# Patient Record
Sex: FEMALE | Race: WHITE
[De-identification: names, ages, dates, MRNs, and addresses within clinical notes are randomized per-mention and may not be internally consistent; named-entity substitution may affect disease eponyms.]

---

## 2017-05-18 ENCOUNTER — APPOINTMENT (OUTPATIENT)
Dept: DERMATOLOGY | Facility: CLINIC | Age: 62
End: 2017-05-18

## 2017-08-29 ENCOUNTER — APPOINTMENT (OUTPATIENT)
Dept: OPHTHALMOLOGY | Facility: CLINIC | Age: 62
End: 2017-08-29

## 2017-09-13 ENCOUNTER — APPOINTMENT (OUTPATIENT)
Dept: DERMATOLOGY | Facility: CLINIC | Age: 62
End: 2017-09-13
Payer: COMMERCIAL

## 2017-09-13 PROCEDURE — 99214 OFFICE O/P EST MOD 30 MIN: CPT

## 2017-12-13 ENCOUNTER — MOBILE ON CALL (OUTPATIENT)
Age: 62
End: 2017-12-13

## 2017-12-13 ENCOUNTER — LABORATORY RESULT (OUTPATIENT)
Age: 62
End: 2017-12-13

## 2017-12-13 ENCOUNTER — APPOINTMENT (OUTPATIENT)
Dept: DERMATOLOGY | Facility: CLINIC | Age: 62
End: 2017-12-13
Payer: COMMERCIAL

## 2017-12-13 PROCEDURE — 12032 INTMD RPR S/A/T/EXT 2.6-7.5: CPT

## 2017-12-13 PROCEDURE — 11406 EXC TR-EXT B9+MARG >4.0 CM: CPT

## 2017-12-20 ENCOUNTER — APPOINTMENT (OUTPATIENT)
Dept: DERMATOLOGY | Facility: CLINIC | Age: 62
End: 2017-12-20
Payer: COMMERCIAL

## 2017-12-20 DIAGNOSIS — L72.9 FOLLICULAR CYST OF THE SKIN AND SUBCUTANEOUS TISSUE, UNSPECIFIED: ICD-10-CM

## 2017-12-20 PROCEDURE — 99024 POSTOP FOLLOW-UP VISIT: CPT

## 2018-04-11 ENCOUNTER — APPOINTMENT (OUTPATIENT)
Dept: DERMATOLOGY | Facility: CLINIC | Age: 63
End: 2018-04-11
Payer: COMMERCIAL

## 2018-04-11 VITALS
HEIGHT: 62 IN | WEIGHT: 128 LBS | SYSTOLIC BLOOD PRESSURE: 126 MMHG | BODY MASS INDEX: 23.55 KG/M2 | DIASTOLIC BLOOD PRESSURE: 80 MMHG

## 2018-04-11 PROCEDURE — 99214 OFFICE O/P EST MOD 30 MIN: CPT | Mod: 25

## 2018-04-11 PROCEDURE — 17000 DESTRUCT PREMALG LESION: CPT

## 2019-05-06 ENCOUNTER — APPOINTMENT (OUTPATIENT)
Dept: DERMATOLOGY | Facility: CLINIC | Age: 64
End: 2019-05-06
Payer: COMMERCIAL

## 2019-05-06 PROCEDURE — 99214 OFFICE O/P EST MOD 30 MIN: CPT | Mod: GC

## 2020-07-20 ENCOUNTER — APPOINTMENT (OUTPATIENT)
Dept: DERMATOLOGY | Facility: CLINIC | Age: 65
End: 2020-07-20
Payer: COMMERCIAL

## 2020-07-20 VITALS — BODY MASS INDEX: 34.15 KG/M2 | WEIGHT: 200 LBS | HEIGHT: 64 IN

## 2020-07-20 VITALS — TEMPERATURE: 97.7 F

## 2020-07-20 DIAGNOSIS — D23.9 OTHER BENIGN NEOPLASM OF SKIN, UNSPECIFIED: ICD-10-CM

## 2020-07-20 PROCEDURE — 99213 OFFICE O/P EST LOW 20 MIN: CPT

## 2020-11-20 ENCOUNTER — APPOINTMENT (OUTPATIENT)
Dept: DERMATOLOGY | Facility: CLINIC | Age: 65
End: 2020-11-20

## 2021-03-17 ENCOUNTER — APPOINTMENT (OUTPATIENT)
Dept: DERMATOLOGY | Facility: CLINIC | Age: 66
End: 2021-03-17
Payer: COMMERCIAL

## 2021-03-17 ENCOUNTER — NON-APPOINTMENT (OUTPATIENT)
Age: 66
End: 2021-03-17

## 2021-03-17 DIAGNOSIS — R23.8 OTHER SKIN CHANGES: ICD-10-CM

## 2021-03-17 PROCEDURE — 99072 ADDL SUPL MATRL&STAF TM PHE: CPT

## 2021-03-17 PROCEDURE — 99213 OFFICE O/P EST LOW 20 MIN: CPT | Mod: 25

## 2021-03-17 PROCEDURE — 17000 DESTRUCT PREMALG LESION: CPT

## 2022-01-24 ENCOUNTER — APPOINTMENT (OUTPATIENT)
Dept: DERMATOLOGY | Facility: CLINIC | Age: 67
End: 2022-01-24
Payer: COMMERCIAL

## 2022-01-24 DIAGNOSIS — D21.9 BENIGN NEOPLASM OF CONNECTIVE AND OTHER SOFT TISSUE, UNSPECIFIED: ICD-10-CM

## 2022-01-24 DIAGNOSIS — L82.0 INFLAMED SEBORRHEIC KERATOSIS: ICD-10-CM

## 2022-01-24 DIAGNOSIS — Z86.018 PERSONAL HISTORY OF OTHER BENIGN NEOPLASM: ICD-10-CM

## 2022-01-24 DIAGNOSIS — L72.3 SEBACEOUS CYST: ICD-10-CM

## 2022-01-24 PROCEDURE — 11900 INJECT SKIN LESIONS </W 7: CPT | Mod: 59

## 2022-01-24 PROCEDURE — 17110 DESTRUCTION B9 LES UP TO 14: CPT | Mod: 59

## 2022-01-24 PROCEDURE — 99214 OFFICE O/P EST MOD 30 MIN: CPT | Mod: 25

## 2022-01-24 RX ORDER — CEPHALEXIN 500 MG/1
500 CAPSULE ORAL
Qty: 14 | Refills: 0 | Status: ACTIVE | COMMUNITY
Start: 2017-09-13 | End: 1900-01-01

## 2022-01-25 PROBLEM — Z86.018 HISTORY OF MULTIPLE BENIGN MELANOCYTIC NEVI: Status: RESOLVED | Noted: 2019-05-06 | Resolved: 2022-01-24

## 2022-06-29 ENCOUNTER — APPOINTMENT (OUTPATIENT)
Dept: DERMATOLOGY | Facility: CLINIC | Age: 67
End: 2022-06-29

## 2023-01-19 ENCOUNTER — TRANSCRIPTION ENCOUNTER (OUTPATIENT)
Age: 68
End: 2023-01-19

## 2023-02-09 ENCOUNTER — OFFICE (OUTPATIENT)
Dept: URBAN - METROPOLITAN AREA CLINIC 90 | Facility: CLINIC | Age: 68
Setting detail: OPHTHALMOLOGY
End: 2023-02-09
Payer: COMMERCIAL

## 2023-02-09 DIAGNOSIS — H25.13: ICD-10-CM

## 2023-02-09 DIAGNOSIS — H01.002: ICD-10-CM

## 2023-02-09 DIAGNOSIS — H40.012: ICD-10-CM

## 2023-02-09 DIAGNOSIS — H01.001: ICD-10-CM

## 2023-02-09 DIAGNOSIS — H18.413: ICD-10-CM

## 2023-02-09 DIAGNOSIS — H01.005: ICD-10-CM

## 2023-02-09 DIAGNOSIS — H01.004: ICD-10-CM

## 2023-02-09 DIAGNOSIS — H16.223: ICD-10-CM

## 2023-02-09 DIAGNOSIS — H04.211: ICD-10-CM

## 2023-02-09 PROCEDURE — 92133 CPTRZD OPH DX IMG PST SGM ON: CPT | Performed by: OPHTHALMOLOGY

## 2023-02-09 PROCEDURE — 92083 EXTENDED VISUAL FIELD XM: CPT | Performed by: OPHTHALMOLOGY

## 2023-02-09 PROCEDURE — 92014 COMPRE OPH EXAM EST PT 1/>: CPT | Performed by: OPHTHALMOLOGY

## 2023-02-09 ASSESSMENT — REFRACTION_MANIFEST
OD_ADD: +2.50
OD_VA2: 20/J1
OD_AXIS: 140
OD_CYLINDER: +0.75
OD_VA1: 20/20
OS_AXIS: 180
OD_SPHERE: +2.25
OS_VA1: 20/20
OS_ADD: +2.50
OD_VA2: 20/J1
OD_AXIS: 055
OD_CYLINDER: +0.75
OD_SPHERE: +2.00
OS_ADD: +2.25
OS_SPHERE: +3.00
OS_SPHERE: +2.75
OS_AXIS: 180
OS_VA1: 20/20
OS_CYLINDER: +1.00
OS_VA2: 20/J1
OS_SPHERE: +3.00
OD_AXIS: 055
OS_AXIS: 085
OD_ADD: +2.25
OS_VA2: 20/J1
OD_VA1: 20/20
OD_VA2: 20/J1
OS_VA1: 20/20
OD_SPHERE: +2.25
OD_CYLINDER: +0.75
OS_CYLINDER: +0.50
OS_VA2: 20/J1
OS_ADD: +2.50
OS_CYLINDER: +0.50
OD_VA1: 20/20
OD_ADD: +2.50

## 2023-02-09 ASSESSMENT — REFRACTION_CURRENTRX
OD_CYLINDER: -0.75
OD_OVR_VA: 20/
OD_AXIS: 064
OD_VPRISM_DIRECTION: PROGS
OS_OVR_VA: 20/
OS_SPHERE: +3.75
OD_SPHERE: +3.00
OS_AXIS: 001
OD_SPHERE: +2.50
OD_VPRISM_DIRECTION: PROGS
OD_ADD: +2.50
OS_ADD: +2.50
OD_AXIS: 144
OS_OVR_VA: 20/
OS_AXIS: 176
OD_AXIS: 059
OS_ADD: +2.25
OD_SPHERE: +2.50
OS_AXIS: 086
OD_CYLINDER: +0.75
OS_VPRISM_DIRECTION: SV
OD_ADD: +2.25
OD_OVR_VA: 20/
OD_OVR_VA: 20/
OS_ADD: +2.25
OS_VPRISM_DIRECTION: PROGS
OD_CYLINDER: +0.75
OS_OVR_VA: 20/
OS_CYLINDER: +0.50
OS_SPHERE: +3.00
OD_VPRISM_DIRECTION: SV
OD_ADD: +2.25
OS_CYLINDER: -0.75
OS_SPHERE: +3.25
OS_CYLINDER: +0.50
OS_VPRISM_DIRECTION: PROGS

## 2023-02-09 ASSESSMENT — AXIALLENGTH_DERIVED
OS_AL: 22.4732
OD_AL: 22.5276
OS_AL: 22.2544
OD_AL: 22.3514
OD_AL: 22.5276
OS_AL: 22.4732
OS_AL: 22.4732
OD_AL: 22.6168

## 2023-02-09 ASSESSMENT — LID EXAM ASSESSMENTS
OD_BLEPHARITIS: RLL RUL 1+ 2+
OS_BLEPHARITIS: LLL LUL 1+ 2+

## 2023-02-09 ASSESSMENT — SPHEQUIV_DERIVED
OS_SPHEQUIV: 3.875
OD_SPHEQUIV: 2.625
OS_SPHEQUIV: 3.25
OS_SPHEQUIV: 3.25
OD_SPHEQUIV: 2.375
OD_SPHEQUIV: 2.625
OS_SPHEQUIV: 3.25
OD_SPHEQUIV: 3.125

## 2023-02-09 ASSESSMENT — REFRACTION_AUTOREFRACTION
OS_CYLINDER: -1.25
OD_SPHERE: +3.50
OD_AXIS: 134
OS_SPHERE: +4.50
OS_AXIS: 083
OD_CYLINDER: -0.75

## 2023-02-09 ASSESSMENT — TONOMETRY
OD_IOP_MMHG: 16
OS_IOP_MMHG: 16

## 2023-02-09 ASSESSMENT — KERATOMETRY
OS_K1POWER_DIOPTERS: 42.75
OS_AXISANGLE_DEGREES: 167
OD_K1POWER_DIOPTERS: 43.25
OD_AXISANGLE_DEGREES: 046
OS_K2POWER_DIOPTERS: 43.75
OD_K2POWER_DIOPTERS: 44.25
METHOD_AUTO_MANUAL: AUTO

## 2023-02-09 ASSESSMENT — VISUAL ACUITY
OS_BCVA: 20/20
OD_BCVA: 20/20-1

## 2023-02-09 ASSESSMENT — CONFRONTATIONAL VISUAL FIELD TEST (CVF)
OD_FINDINGS: FULL
OS_FINDINGS: FULL

## 2023-02-09 ASSESSMENT — SUPERFICIAL PUNCTATE KERATITIS (SPK)
OD_SPK: T
OS_SPK: 1+

## 2023-02-09 ASSESSMENT — TEAR BREAK UP TIME (TBUT)
OS_TBUT: 1+
OD_TBUT: 1+

## 2023-03-08 ENCOUNTER — APPOINTMENT (OUTPATIENT)
Dept: DERMATOLOGY | Facility: CLINIC | Age: 68
End: 2023-03-08
Payer: COMMERCIAL

## 2023-03-08 DIAGNOSIS — L57.0 ACTINIC KERATOSIS: ICD-10-CM

## 2023-03-08 PROCEDURE — 17000 DESTRUCT PREMALG LESION: CPT | Mod: 59

## 2023-03-08 PROCEDURE — 99213 OFFICE O/P EST LOW 20 MIN: CPT | Mod: 25

## 2023-03-08 PROCEDURE — 17003 DESTRUCT PREMALG LES 2-14: CPT | Mod: 59

## 2023-11-03 ENCOUNTER — OFFICE (OUTPATIENT)
Dept: URBAN - METROPOLITAN AREA CLINIC 90 | Facility: CLINIC | Age: 68
Setting detail: OPHTHALMOLOGY
End: 2023-11-03
Payer: COMMERCIAL

## 2023-11-03 DIAGNOSIS — H00.11: ICD-10-CM

## 2023-11-03 DIAGNOSIS — H01.005: ICD-10-CM

## 2023-11-03 DIAGNOSIS — H01.004: ICD-10-CM

## 2023-11-03 DIAGNOSIS — H01.001: ICD-10-CM

## 2023-11-03 DIAGNOSIS — H01.002: ICD-10-CM

## 2023-11-03 PROCEDURE — 99213 OFFICE O/P EST LOW 20 MIN: CPT | Performed by: OPHTHALMOLOGY

## 2023-11-03 ASSESSMENT — SUPERFICIAL PUNCTATE KERATITIS (SPK)
OS_SPK: 1+
OD_SPK: T

## 2023-11-03 ASSESSMENT — CONFRONTATIONAL VISUAL FIELD TEST (CVF)
OS_FINDINGS: FULL
OD_FINDINGS: FULL

## 2023-11-03 ASSESSMENT — TEAR BREAK UP TIME (TBUT)
OD_TBUT: 1+
OS_TBUT: 1+

## 2023-11-03 ASSESSMENT — LID EXAM ASSESSMENTS
OD_BLEPHARITIS: RLL RUL 1+ 2+
OS_BLEPHARITIS: LLL LUL 1+ 2+

## 2023-11-17 ENCOUNTER — RX ONLY (RX ONLY)
Age: 68
End: 2023-11-17

## 2023-11-17 ASSESSMENT — REFRACTION_CURRENTRX
OD_CYLINDER: -0.75
OS_SPHERE: +3.75
OD_CYLINDER: +0.75
OD_AXIS: 059
OS_OVR_VA: 20/
OS_OVR_VA: 20/
OD_ADD: +2.25
OS_CYLINDER: +0.50
OS_AXIS: 176
OD_AXIS: 144
OS_CYLINDER: -0.75
OD_VPRISM_DIRECTION: SV
OD_SPHERE: +2.50
OS_AXIS: 001
OS_VPRISM_DIRECTION: PROGS
OD_ADD: +2.25
OS_ADD: +2.25
OD_SPHERE: +2.50
OS_CYLINDER: +0.50
OS_OVR_VA: 20/
OD_OVR_VA: 20/
OD_VPRISM_DIRECTION: PROGS
OD_OVR_VA: 20/
OS_VPRISM_DIRECTION: PROGS
OS_AXIS: 086
OD_VPRISM_DIRECTION: PROGS
OD_SPHERE: +3.00
OD_AXIS: 064
OS_ADD: +2.25
OS_ADD: +2.50
OS_VPRISM_DIRECTION: SV
OD_CYLINDER: +0.75
OD_ADD: +2.50
OS_SPHERE: +3.00
OS_SPHERE: +3.25
OD_OVR_VA: 20/

## 2023-11-17 ASSESSMENT — REFRACTION_AUTOREFRACTION
OS_CYLINDER: -1.25
OD_CYLINDER: -0.75
OD_SPHERE: +3.50
OS_AXIS: 083
OS_SPHERE: +4.50
OD_AXIS: 134

## 2023-11-17 ASSESSMENT — REFRACTION_MANIFEST
OS_ADD: +2.50
OD_SPHERE: +2.25
OD_VA2: 20/J1
OD_SPHERE: +2.25
OD_ADD: +2.50
OS_SPHERE: +3.00
OS_VA2: 20/J1
OS_ADD: +2.25
OD_VA2: 20/J1
OS_VA1: 20/20
OS_CYLINDER: +1.00
OS_CYLINDER: +0.50
OD_CYLINDER: +0.75
OS_CYLINDER: +0.50
OS_ADD: +2.50
OS_SPHERE: +2.75
OD_AXIS: 055
OD_VA2: 20/J1
OD_VA1: 20/20
OD_CYLINDER: +0.75
OS_SPHERE: +3.00
OD_VA1: 20/20
OD_ADD: +2.50
OD_AXIS: 140
OD_CYLINDER: +0.75
OS_VA1: 20/20
OS_VA2: 20/J1
OD_ADD: +2.25
OD_AXIS: 055
OS_AXIS: 180
OS_AXIS: 180
OS_VA2: 20/J1
OD_VA1: 20/20
OS_AXIS: 085
OS_VA1: 20/20
OD_SPHERE: +2.00

## 2023-11-17 ASSESSMENT — SPHEQUIV_DERIVED
OS_SPHEQUIV: 3.875
OS_SPHEQUIV: 3.25
OS_SPHEQUIV: 3.25
OD_SPHEQUIV: 2.625
OD_SPHEQUIV: 2.375
OS_SPHEQUIV: 3.25
OD_SPHEQUIV: 3.125
OD_SPHEQUIV: 2.625

## 2024-02-08 ENCOUNTER — OFFICE (OUTPATIENT)
Dept: URBAN - METROPOLITAN AREA CLINIC 90 | Facility: CLINIC | Age: 69
Setting detail: OPHTHALMOLOGY
End: 2024-02-08
Payer: COMMERCIAL

## 2024-02-08 ENCOUNTER — RX ONLY (RX ONLY)
Age: 69
End: 2024-02-08

## 2024-02-08 DIAGNOSIS — H40.012: ICD-10-CM

## 2024-02-08 DIAGNOSIS — H01.001: ICD-10-CM

## 2024-02-08 DIAGNOSIS — H16.223: ICD-10-CM

## 2024-02-08 DIAGNOSIS — H01.004: ICD-10-CM

## 2024-02-08 DIAGNOSIS — H25.13: ICD-10-CM

## 2024-02-08 DIAGNOSIS — H04.211: ICD-10-CM

## 2024-02-08 DIAGNOSIS — H18.413: ICD-10-CM

## 2024-02-08 PROCEDURE — 92083 EXTENDED VISUAL FIELD XM: CPT | Performed by: OPHTHALMOLOGY

## 2024-02-08 PROCEDURE — 92014 COMPRE OPH EXAM EST PT 1/>: CPT | Performed by: OPHTHALMOLOGY

## 2024-02-08 PROCEDURE — 92133 CPTRZD OPH DX IMG PST SGM ON: CPT | Performed by: OPHTHALMOLOGY

## 2024-02-08 ASSESSMENT — REFRACTION_MANIFEST
OD_VA2: 20/J1
OS_SPHERE: +3.00
OS_VA1: 20/20
OD_CYLINDER: +0.75
OD_CYLINDER: +0.75
OS_ADD: +2.50
OS_VA2: 20/J1
OS_SPHERE: +3.50
OD_CYLINDER: -0.75
OS_SPHERE: +2.75
OS_VA1: 20/20
OS_AXIS: 085
OD_AXIS: 140
OS_ADD: +2.50
OD_VA1: 20/20
OS_AXIS: 180
OD_ADD: +2.25
OD_SPHERE: +2.25
OD_VA2: 20/J1
OS_ADD: +2.50
OD_SPHERE: +3.00
OS_ADD: +2.25
OD_VA2: 20/J1
OS_CYLINDER: +1.00
OD_VA1: 20/20
OS_VA1: 20/20
OS_CYLINDER: +0.50
OS_SPHERE: +3.00
OS_CYLINDER: -0.50
OD_CYLINDER: +0.75
OS_AXIS: 180
OS_AXIS: 090
OD_VA1: 20/20
OD_VA2: 20/J1
OD_SPHERE: +2.25
OD_AXIS: 055
OD_SPHERE: +2.00
OS_VA2: 20/J1
OD_ADD: +2.50
OS_VA2: 20/J1
OD_AXIS: 145
OD_VA1: 20/20
OS_CYLINDER: +0.50
OD_AXIS: 055
OD_ADD: +2.50
OS_VA1: 20/20
OS_VA2: 20/J1
OD_ADD: +2.50

## 2024-02-08 ASSESSMENT — REFRACTION_CURRENTRX
OD_AXIS: 064
OD_OVR_VA: 20/
OD_AXIS: 059
OD_SPHERE: +3.00
OD_OVR_VA: 20/
OD_VPRISM_DIRECTION: PROGS
OD_CYLINDER: +0.75
OS_ADD: +2.50
OD_ADD: +2.25
OD_SPHERE: +2.50
OS_AXIS: 001
OD_VPRISM_DIRECTION: PROGS
OS_VPRISM_DIRECTION: SV
OS_VPRISM_DIRECTION: PROGS
OS_SPHERE: +3.25
OD_ADD: +2.50
OS_SPHERE: +3.50
OD_VPRISM_DIRECTION: PROGS
OS_AXIS: 086
OS_VPRISM_DIRECTION: PROGS
OD_AXIS: 141
OS_AXIS: 090
OS_OVR_VA: 20/
OS_SPHERE: +3.00
OD_SPHERE: +2.50
OS_CYLINDER: +0.50
OS_ADD: +2.00
OS_CYLINDER: +0.50
OS_VPRISM_DIRECTION: PROGS
OS_SPHERE: +3.75
OS_CYLINDER: -0.75
OS_OVR_VA: 20/
OD_AXIS: 144
OS_ADD: +2.25
OD_CYLINDER: -0.75
OD_OVR_VA: 20/
OD_ADD: +2.25
OS_ADD: +2.25
OD_CYLINDER: +0.75
OS_AXIS: 176
OD_SPHERE: +3.00
OD_ADD: +2.00
OD_VPRISM_DIRECTION: SV
OD_CYLINDER: -0.75
OS_OVR_VA: 20/
OS_CYLINDER: -0.50

## 2024-02-08 ASSESSMENT — SPHEQUIV_DERIVED
OD_SPHEQUIV: 2.625
OD_SPHEQUIV: 2.625
OS_SPHEQUIV: 3.25
OD_SPHEQUIV: 2.625
OS_SPHEQUIV: 3.25
OS_SPHEQUIV: 3.25
OS_SPHEQUIV: 4
OS_SPHEQUIV: 3.25
OD_SPHEQUIV: 2.75
OD_SPHEQUIV: 2.375

## 2024-02-08 ASSESSMENT — REFRACTION_AUTOREFRACTION
OD_CYLINDER: -1.00
OS_CYLINDER: -1.50
OS_SPHERE: +4.75
OS_AXIS: 087
OD_SPHERE: +3.25
OD_AXIS: 128

## 2024-02-08 ASSESSMENT — LID EXAM ASSESSMENTS
OS_BLEPHARITIS: LUL 1+ 2+
OD_BLEPHARITIS: RUL 1+ 2+

## 2024-02-08 ASSESSMENT — TEAR BREAK UP TIME (TBUT)
OS_TBUT: 1+
OD_TBUT: 1+

## 2024-02-08 ASSESSMENT — CONFRONTATIONAL VISUAL FIELD TEST (CVF)
OD_FINDINGS: FULL
OS_FINDINGS: FULL

## 2024-02-08 ASSESSMENT — SUPERFICIAL PUNCTATE KERATITIS (SPK)
OD_SPK: T
OS_SPK: 1+

## 2024-04-03 ENCOUNTER — APPOINTMENT (OUTPATIENT)
Dept: DERMATOLOGY | Facility: CLINIC | Age: 69
End: 2024-04-03
Payer: COMMERCIAL

## 2024-04-03 DIAGNOSIS — Z12.83 ENCOUNTER FOR SCREENING FOR MALIGNANT NEOPLASM OF SKIN: ICD-10-CM

## 2024-04-03 DIAGNOSIS — D18.01 HEMANGIOMA OF SKIN AND SUBCUTANEOUS TISSUE: ICD-10-CM

## 2024-04-03 DIAGNOSIS — D22.9 MELANOCYTIC NEVI, UNSPECIFIED: ICD-10-CM

## 2024-04-03 DIAGNOSIS — L81.4 OTHER MELANIN HYPERPIGMENTATION: ICD-10-CM

## 2024-04-03 DIAGNOSIS — L82.1 OTHER SEBORRHEIC KERATOSIS: ICD-10-CM

## 2024-04-03 PROCEDURE — G0444 DEPRESSION SCREEN ANNUAL: CPT | Mod: 59

## 2024-04-03 PROCEDURE — 99213 OFFICE O/P EST LOW 20 MIN: CPT

## 2024-04-03 NOTE — ASSESSMENT
[FreeTextEntry1] : # Nevi, # Cherry Angiomas, and # SKs - clinically benign appearing  - I counseled the patient on the importance of sun protection and monthly self skin exams at home. We have discussed the ABCDEs signs of melanoma. - I discussed the nature and usual course of these lesions with the patient. Patient reassured  # Solar lentigines  - Discussed etiology and benign nature of condition - Sun protective measures reinforced. Recommended OTC sunscreen products, including SPF30+ with broadband UV protection as well as proper use.  # Carlton Hyp, R upper eyelid - Benign condition discussed with patient - No further treatment indicated at this time  # Screening exam for skin cancer - no suspicious lesions on exam today - TBSE performed today - Advised sun protection.  Recommended OTC sunscreen products (EltaMD/Neutrogena/La Roche Posay), including SPF30+ with broadband UV protection as well as proper use.  Discussed OTC sun protective clothing - Counseled patient to monitor for changes, including mole monitoring and self-skin exams

## 2024-04-03 NOTE — PHYSICAL EXAM
[Alert] : alert [Oriented x 3] : ~L oriented x 3 [Well Nourished] : well nourished [Conjunctiva Non-injected] : conjunctiva non-injected [No Visual Lymphadenopathy] : no visual  lymphadenopathy [No Clubbing] : no clubbing [No Bromhidrosis] : no bromhidrosis [No Edema] : no edema [No Chromhidrosis] : no chromhidrosis [Full Body Skin Exam Performed] : performed [FreeTextEntry3] : General: Alert and oriented, in NAD.  All of the following were examined and were within normal limits, except as noted:   Scalp:  Face, including eyelids, nose, lips, ears, oropharynx:  Neck:  Chest/Back/Abdomen:  Bilateral Arms/Hands:  Bilateral Legs/Feet:  Buttocks, Genitalia, Anus/perineum:   Hair, Nails, Oral Mucosa, Eyes:   - yellow papule R upper eyelid - multiple stuck-on keratotic papules, fairly uniform and regular brown macules and papules scattered throughout the body - small, well-demarcated hyperpigmented macules and patches on the head, neck and upper body

## 2024-04-03 NOTE — HISTORY OF PRESENT ILLNESS
[FreeTextEntry1] : spots [TextBox_22] : 0 [de-identified] : ROBERTA LANDAU is a 69 year old F who present for f/u as below.  FBSE.  Spots scattered on body x years. Asymptomatic and unchanged. No alleviating/aggravating factors. Never been treated.  No personal or family hx of skin cancer. Social hx: admin director at [employer] [TextBox_20] : 04/2024

## 2024-05-29 ENCOUNTER — APPOINTMENT (OUTPATIENT)
Dept: ORTHOPEDIC SURGERY | Facility: CLINIC | Age: 69
End: 2024-05-29
Payer: COMMERCIAL

## 2024-05-29 VITALS — HEIGHT: 62 IN | WEIGHT: 133 LBS | BODY MASS INDEX: 24.48 KG/M2

## 2024-05-29 DIAGNOSIS — M51.36 OTHER INTERVERTEBRAL DISC DEGENERATION, LUMBAR REGION: ICD-10-CM

## 2024-05-29 DIAGNOSIS — M54.50 LOW BACK PAIN, UNSPECIFIED: ICD-10-CM

## 2024-05-29 PROCEDURE — 99204 OFFICE O/P NEW MOD 45 MIN: CPT

## 2024-05-29 PROCEDURE — 72100 X-RAY EXAM L-S SPINE 2/3 VWS: CPT

## 2024-05-29 NOTE — ADDENDUM
[FreeTextEntry1] : This note was written by Surya Walls on 05/29/2024 acting as scribe for Dr. Harjit Corrales M.D.  I, Harjit Corrales MD, have read and attest that all the information, medical decision making and discharge instructions within are true and accurate.

## 2024-05-29 NOTE — PHYSICAL EXAM
[Normal] : Gait: normal [Solis's Sign] : negative Solis's sign [Pronator Drift] : negative pronator drift [SLR] : negative straight leg raise [de-identified] : 5 out of 5 motor strength, sensation is intact and symmetrical full range of motion flexion extension and rotation, no palpatory tenderness full range of motion of hips knees shoulders and elbows (all four extremities), no atrophy, negative straight leg raise, no pathological reflexes, no swelling, normal ambulation, no apparent distress skin is intact, no palpable lymph nodes, no upper or lower extremity instability, alert and oriented x3 and normal mood. Normal finger-to nose test.  No upper motor neuron findings. Left SI joint pain. [de-identified] : XR AP Lat Lumbar 05/29/2024 -adequate- reviewed with patient.

## 2024-05-29 NOTE — DISCUSSION/SUMMARY
[de-identified] : Lumbar strain and sprain. Left sacroiliitis. Feeling 95% better. Discussed all options. Referral for physical therapy. F/U PRN. If no better, SI joint injection. All options discussed including rest, medicine, home exercise, acupuncture, Chiropractic care, Physical Therapy, Pain management, and last resort surgery. All questions were answered, all alternatives discussed, and the patient is in complete agreement with the treatment plan which the patient contributed to and discussed with me through the shared decision-making process. Follow-up appointment as instructed. Any issues and the patient will call or come in sooner.
Detail Level: Generalized
Detail Level: Detailed

## 2024-05-29 NOTE — HISTORY OF PRESENT ILLNESS
[de-identified] : 69 year old female presents for initial evaluation of lower back pain x 3-4 weeks. She states that she had episodes of lower back pain in the past which resolves on its own.  She states that her pain was more intense the last few weeks. Denies radiation of pain down the legs.  She had also had pain in her right ankle, and had seen a foot and ankle specialist and had XRs performed which were unremarkable. She states that it may have been stemming from her back. Denies numbness/tingling.  Took ibuprofen for the pain which helped. Has not tried PT or chiropractic care. Denies LAUREANO. PMHx: HTN, HLD, osteoporosis on fosamax No fever, chills, sweats, nausea/vomiting. No bowel or bladder dysfunction, no recent weight loss or gain. No night pain. This history is in addition to the intake form that I personally reviewed. [Stable] : stable

## 2025-02-13 ENCOUNTER — OFFICE (OUTPATIENT)
Facility: LOCATION | Age: 70
Setting detail: OPHTHALMOLOGY
End: 2025-02-13
Payer: COMMERCIAL

## 2025-02-13 ENCOUNTER — RX ONLY (RX ONLY)
Age: 70
End: 2025-02-13

## 2025-02-13 DIAGNOSIS — H18.413: ICD-10-CM

## 2025-02-13 DIAGNOSIS — H01.001: ICD-10-CM

## 2025-02-13 DIAGNOSIS — H04.211: ICD-10-CM

## 2025-02-13 DIAGNOSIS — H01.004: ICD-10-CM

## 2025-02-13 DIAGNOSIS — H40.012: ICD-10-CM

## 2025-02-13 DIAGNOSIS — H25.13: ICD-10-CM

## 2025-02-13 DIAGNOSIS — H16.223: ICD-10-CM

## 2025-02-13 PROCEDURE — 92133 CPTRZD OPH DX IMG PST SGM ON: CPT | Performed by: OPHTHALMOLOGY

## 2025-02-13 PROCEDURE — 92083 EXTENDED VISUAL FIELD XM: CPT | Performed by: OPHTHALMOLOGY

## 2025-02-13 PROCEDURE — 92014 COMPRE OPH EXAM EST PT 1/>: CPT | Performed by: OPHTHALMOLOGY

## 2025-02-13 ASSESSMENT — REFRACTION_AUTOREFRACTION
OD_AXIS: 121
OD_SPHERE: +3.25
OS_CYLINDER: -1.50
OD_CYLINDER: -1.00
OS_SPHERE: +4.75
OS_AXIS: 084

## 2025-02-13 ASSESSMENT — REFRACTION_CURRENTRX
OS_SPHERE: +3.75
OS_ADD: +2.25
OS_AXIS: 001
OD_CYLINDER: -0.75
OS_SPHERE: +3.25
OD_AXIS: 144
OD_CYLINDER: +0.75
OS_CYLINDER: -0.50
OS_VPRISM_DIRECTION: PROGS
OD_SPHERE: +3.00
OD_AXIS: 141
OD_SPHERE: +3.00
OD_VPRISM_DIRECTION: PROGS
OS_SPHERE: +3.00
OS_VPRISM_DIRECTION: PROGS
OS_AXIS: 090
OD_ADD: +2.25
OD_SPHERE: +3.00
OS_SPHERE: +3.50
OS_CYLINDER: +0.50
OS_ADD: +2.25
OS_SPHERE: +3.50
OD_VPRISM_DIRECTION: PROGS
OD_CYLINDER: -0.75
OS_OVR_VA: 20/
OD_CYLINDER: -0.75
OD_OVR_VA: 20/
OD_ADD: +2.50
OD_OVR_VA: 20/
OS_VPRISM_DIRECTION: SV
OS_VPRISM_DIRECTION: PROGS
OS_VPRISM_DIRECTION: PROGS
OD_OVR_VA: 20/
OS_AXIS: 090
OS_AXIS: 086
OD_AXIS: 064
OD_SPHERE: +2.50
OS_CYLINDER: -0.75
OS_ADD: +2.50
OS_CYLINDER: +0.50
OD_VPRISM_DIRECTION: PROGS
OD_VPRISM_DIRECTION: PROGS
OD_AXIS: 059
OD_ADD: +2.50
OD_AXIS: 147
OS_AXIS: 176
OD_VPRISM_DIRECTION: SV
OD_CYLINDER: +0.75
OD_ADD: +2.00
OS_OVR_VA: 20/
OD_SPHERE: +2.50
OS_ADD: +2.00
OS_ADD: +2.50
OS_OVR_VA: 20/
OS_CYLINDER: -0.50
OD_ADD: +2.25

## 2025-02-13 ASSESSMENT — REFRACTION_MANIFEST
OS_CYLINDER: +0.50
OS_ADD: +2.50
OD_SPHERE: +2.00
OS_CYLINDER: +1.00
OS_CYLINDER: +0.50
OD_VA1: 20/20
OS_SPHERE: +2.75
OS_VA2: 20/J1
OD_AXIS: 145
OS_SPHERE: +3.00
OD_VA2: 20/J1
OD_VA1: 20/20
OS_VA1: 20/20
OS_ADD: +2.50
OD_ADD: +2.50
OS_VA2: 20/J1
OS_VA1: 20/20
OD_AXIS: 140
OS_VA1: 20/20
OD_SPHERE: +2.25
OD_SPHERE: +3.00
OS_CYLINDER: -0.50
OD_SPHERE: +2.25
OS_VA2: 20/J1
OS_AXIS: 180
OD_ADD: +2.25
OS_SPHERE: +3.50
OS_VA2: 20/J1
OS_VA1: 20/20
OD_VA1: 20/20
OS_ADD: +2.25
OS_AXIS: 085
OS_AXIS: 180
OD_VA2: 20/J1
OD_AXIS: 055
OD_CYLINDER: -0.75
OD_ADD: +2.50
OD_AXIS: 055
OD_CYLINDER: +0.75
OD_CYLINDER: +0.75
OD_ADD: +2.50
OS_ADD: +2.50
OD_CYLINDER: +0.75
OD_VA2: 20/J1
OD_VA1: 20/20
OS_AXIS: 090
OS_SPHERE: +3.00
OD_VA2: 20/J1

## 2025-02-13 ASSESSMENT — CONFRONTATIONAL VISUAL FIELD TEST (CVF)
OD_FINDINGS: FULL
OS_FINDINGS: FULL

## 2025-02-13 ASSESSMENT — TEAR BREAK UP TIME (TBUT)
OD_TBUT: 1+
OS_TBUT: 1+

## 2025-02-13 ASSESSMENT — KERATOMETRY
OS_K1POWER_DIOPTERS: 42.25
OD_K2POWER_DIOPTERS: 44.00
METHOD_AUTO_MANUAL: AUTO
OD_K1POWER_DIOPTERS: 43.25
OD_AXISANGLE_DEGREES: 025
OS_AXISANGLE_DEGREES: 163
OS_K2POWER_DIOPTERS: 43.75

## 2025-02-13 ASSESSMENT — SUPERFICIAL PUNCTATE KERATITIS (SPK)
OS_SPK: 1+
OD_SPK: T

## 2025-02-13 ASSESSMENT — LID EXAM ASSESSMENTS
OD_BLEPHARITIS: RUL 1+ 2+
OS_BLEPHARITIS: LUL 1+ 2+

## 2025-02-13 ASSESSMENT — VISUAL ACUITY
OD_BCVA: 20/20
OS_BCVA: 20/20-2

## 2025-04-09 ENCOUNTER — NON-APPOINTMENT (OUTPATIENT)
Age: 70
End: 2025-04-09

## 2025-04-09 ENCOUNTER — APPOINTMENT (OUTPATIENT)
Dept: DERMATOLOGY | Facility: CLINIC | Age: 70
End: 2025-04-09
Payer: COMMERCIAL

## 2025-04-09 DIAGNOSIS — L30.9 DERMATITIS, UNSPECIFIED: ICD-10-CM

## 2025-04-09 DIAGNOSIS — Z12.83 ENCOUNTER FOR SCREENING FOR MALIGNANT NEOPLASM OF SKIN: ICD-10-CM

## 2025-04-09 DIAGNOSIS — L81.4 OTHER MELANIN HYPERPIGMENTATION: ICD-10-CM

## 2025-04-09 DIAGNOSIS — L82.1 OTHER SEBORRHEIC KERATOSIS: ICD-10-CM

## 2025-04-09 DIAGNOSIS — L60.1 ONYCHOLYSIS: ICD-10-CM

## 2025-04-09 DIAGNOSIS — I78.1 NEVUS, NON-NEOPLASTIC: ICD-10-CM

## 2025-04-09 DIAGNOSIS — D22.9 MELANOCYTIC NEVI, UNSPECIFIED: ICD-10-CM

## 2025-04-09 DIAGNOSIS — D48.5 NEOPLASM OF UNCERTAIN BEHAVIOR OF SKIN: ICD-10-CM

## 2025-04-09 PROCEDURE — 11102 TANGNTL BX SKIN SINGLE LES: CPT

## 2025-04-09 PROCEDURE — 11103 TANGNTL BX SKIN EA SEP/ADDL: CPT

## 2025-04-09 PROCEDURE — 99214 OFFICE O/P EST MOD 30 MIN: CPT | Mod: 25

## 2025-04-09 RX ORDER — CLOBETASOL PROPIONATE 0.5 MG/G
0.05 OINTMENT TOPICAL
Qty: 1 | Refills: 1 | Status: ACTIVE | COMMUNITY
Start: 2025-04-09 | End: 1900-01-01

## 2025-04-23 ENCOUNTER — NON-APPOINTMENT (OUTPATIENT)
Age: 70
End: 2025-04-23

## 2025-05-05 ENCOUNTER — APPOINTMENT (OUTPATIENT)
Dept: DERMATOLOGY | Facility: CLINIC | Age: 70
End: 2025-05-05
Payer: COMMERCIAL

## 2025-05-05 DIAGNOSIS — D48.5 NEOPLASM OF UNCERTAIN BEHAVIOR OF SKIN: ICD-10-CM

## 2025-05-05 PROCEDURE — 11602 EXC TR-EXT MAL+MARG 1.1-2 CM: CPT

## 2025-05-05 PROCEDURE — 12032 INTMD RPR S/A/T/EXT 2.6-7.5: CPT

## 2025-05-05 RX ORDER — DOXYCYCLINE 100 MG/1
100 CAPSULE ORAL
Qty: 14 | Refills: 0 | Status: ACTIVE | COMMUNITY
Start: 2025-05-05 | End: 1900-01-01

## 2025-05-06 ENCOUNTER — NON-APPOINTMENT (OUTPATIENT)
Age: 70
End: 2025-05-06

## 2025-05-19 ENCOUNTER — APPOINTMENT (OUTPATIENT)
Dept: DERMATOLOGY | Facility: CLINIC | Age: 70
End: 2025-05-19
Payer: COMMERCIAL

## 2025-05-19 DIAGNOSIS — T14.8XXA OTHER INJURY OF UNSPECIFIED BODY REGION, INITIAL ENCOUNTER: ICD-10-CM

## 2025-05-19 PROCEDURE — 99203 OFFICE O/P NEW LOW 30 MIN: CPT

## 2025-05-19 PROCEDURE — 99024 POSTOP FOLLOW-UP VISIT: CPT

## 2025-05-19 RX ORDER — MUPIROCIN 20 MG/G
2 OINTMENT TOPICAL
Qty: 2 | Refills: 1 | Status: ACTIVE | COMMUNITY
Start: 2025-05-19 | End: 1900-01-01

## 2025-05-28 ENCOUNTER — APPOINTMENT (OUTPATIENT)
Dept: DERMATOLOGY | Facility: CLINIC | Age: 70
End: 2025-05-28
Payer: COMMERCIAL

## 2025-05-28 VITALS — WEIGHT: 128 LBS | BODY MASS INDEX: 23.41 KG/M2

## 2025-05-28 DIAGNOSIS — Z87.2 PERSONAL HISTORY OF DISEASES OF THE SKIN AND SUBCUTANEOUS TISSUE: ICD-10-CM

## 2025-05-28 DIAGNOSIS — L30.9 DERMATITIS, UNSPECIFIED: ICD-10-CM

## 2025-05-28 DIAGNOSIS — D48.5 NEOPLASM OF UNCERTAIN BEHAVIOR OF SKIN: ICD-10-CM

## 2025-05-28 PROCEDURE — 99213 OFFICE O/P EST LOW 20 MIN: CPT | Mod: 24
